# Patient Record
Sex: FEMALE | Race: WHITE
[De-identification: names, ages, dates, MRNs, and addresses within clinical notes are randomized per-mention and may not be internally consistent; named-entity substitution may affect disease eponyms.]

---

## 2019-10-11 ENCOUNTER — HOSPITAL ENCOUNTER (OUTPATIENT)
Dept: HOSPITAL 47 - RADMAMWWP | Age: 44
Discharge: HOME | End: 2019-10-11
Attending: FAMILY MEDICINE
Payer: COMMERCIAL

## 2019-10-11 DIAGNOSIS — Z12.31: Primary | ICD-10-CM

## 2019-10-11 PROCEDURE — 77067 SCR MAMMO BI INCL CAD: CPT

## 2019-10-14 NOTE — MM
Reason for exam: screening  (asymptomatic).

Last mammogram was performed 3 years and 1 month ago.



History:

Patient is nulliparous.



Physical Findings:

A clinical breast exam by your physician is recommended on an annual basis and 

results should be correlated with mammographic findings.



MG Screening Mammo w CAD

Bilateral CC and MLO view(s) were taken.

Prior study comparison: September 16, 2016, bilateral MG 3d screening mammo w/cad.

The breast tissue is heterogeneously dense. This may lower the sensitivity of 

mammography.  Benign appearing diffuse calcifications in the right breast. No 

suspicious abnormality. Stable central left posterior depth asymmetry.  No 

significant changes when compared with prior studies.





ASSESSMENT: Benign, BI-RAD 2



RECOMMENDATION:

Routine screening mammogram of both breasts in 1 year.

## 2022-01-07 ENCOUNTER — HOSPITAL ENCOUNTER (OUTPATIENT)
Dept: HOSPITAL 47 - RADMAMWWP | Age: 47
Discharge: HOME | End: 2022-01-07
Attending: FAMILY MEDICINE
Payer: COMMERCIAL

## 2022-01-07 DIAGNOSIS — Z12.31: Primary | ICD-10-CM

## 2022-01-07 PROCEDURE — 77067 SCR MAMMO BI INCL CAD: CPT

## 2022-01-07 PROCEDURE — 77063 BREAST TOMOSYNTHESIS BI: CPT

## 2022-01-10 NOTE — MM
Reason for exam: screening  (asymptomatic).

Last mammogram was performed 2 years and 3 months ago.



History:

Patient is nulliparous.

Took hormonal contraceptives for 15 years beginning at age 16.



Physical Findings:

A clinical breast exam by your physician is recommended on an annual basis and 

results should be correlated with mammographic findings.



MG 3D Screening Mammo W/Cad

Bilateral CC and MLO view(s) were taken.

Prior study comparison: October 11, 2019, bilateral MG screening mammo w CAD.  

September 16, 2016, bilateral MG 3d screening mammo w/cad.

The breast tissue is heterogeneously dense. This may lower the sensitivity of 

mammography.  Benign appearing bilateral calcifications. Grouped calcifications 

left upper inner quadrant.





ASSESSMENT: Incomplete: need additional imaging evaluation, BI-RAD 0



RECOMMENDATION:

Special view mammogram of the left breast.



[facility] will attempt to contact patient to return for supplemental 

views.

## 2022-01-12 ENCOUNTER — HOSPITAL ENCOUNTER (OUTPATIENT)
Dept: HOSPITAL 47 - RADMAMWWP | Age: 47
Discharge: HOME | End: 2022-01-12
Attending: FAMILY MEDICINE
Payer: COMMERCIAL

## 2022-01-12 DIAGNOSIS — R92.8: Primary | ICD-10-CM

## 2022-01-12 PROCEDURE — 77061 BREAST TOMOSYNTHESIS UNI: CPT

## 2022-01-12 PROCEDURE — 77065 DX MAMMO INCL CAD UNI: CPT

## 2022-01-13 NOTE — MM
Reason for exam: additional evaluation requested from abnormal screening.

Last mammogram was performed less than 1 month ago.



History:

Patient is nulliparous.

Took hormonal contraceptives for 15 years beginning at age 16.



Physical Findings:

Nurse did not find any significant physical abnormalities on exam.



MG 3D Work Up W/Cad LT

CC with magnification, LM with magnification, and LM view(s) were taken of the 

left breast.

Prior study comparison: January 7, 2022, bilateral MG 3d screening mammo w/cad.  

October 11, 2019, bilateral MG screening mammo w CAD.

The breast tissue is heterogeneously dense. This may lower the sensitivity of 

mammography.  No persistent suspicious grouped calcifications in the left breast 

on additional views.



These results were verbally communicated with the patient and result sheet given 

to the patient on 1/12/22.





ASSESSMENT: Negative, BI-RAD 1



RECOMMENDATION:

Return to routine screening mammogram schedule for both breasts.

## 2023-06-30 ENCOUNTER — HOSPITAL ENCOUNTER (OUTPATIENT)
Dept: HOSPITAL 47 - ORWHC2ENDO | Age: 48
Discharge: HOME | End: 2023-06-30
Attending: INTERNAL MEDICINE
Payer: COMMERCIAL

## 2023-06-30 VITALS — DIASTOLIC BLOOD PRESSURE: 78 MMHG | HEART RATE: 70 BPM | SYSTOLIC BLOOD PRESSURE: 125 MMHG

## 2023-06-30 VITALS — TEMPERATURE: 97.6 F

## 2023-06-30 VITALS — RESPIRATION RATE: 16 BRPM

## 2023-06-30 DIAGNOSIS — Z79.899: ICD-10-CM

## 2023-06-30 DIAGNOSIS — Z80.0: ICD-10-CM

## 2023-06-30 DIAGNOSIS — K21.9: ICD-10-CM

## 2023-06-30 DIAGNOSIS — F32.A: ICD-10-CM

## 2023-06-30 DIAGNOSIS — D12.8: ICD-10-CM

## 2023-06-30 DIAGNOSIS — Z12.11: Primary | ICD-10-CM

## 2023-06-30 PROCEDURE — 45385 COLONOSCOPY W/LESION REMOVAL: CPT

## 2023-06-30 PROCEDURE — 88305 TISSUE EXAM BY PATHOLOGIST: CPT

## 2023-06-30 PROCEDURE — 81025 URINE PREGNANCY TEST: CPT

## 2023-06-30 NOTE — P.PCN
Date of Procedure: 06/30/23


Procedure(s) Performed: 


BRIEF HISTORY: Patient is a 47-year-old pleasant white female scheduled for an 

elective colonoscopy as a part of screening for colon cancer and family history 

of colon cancer.  Her mother was tightness with colon cancer at age 74.





PROCEDURE PERFORMED: Colonoscopy with snare polypectomy. 





PREOPERATIVE DIAGNOSIS: Screening for colon cancer and family history of colon 

cancer. 





IV sedation per Anesthesia. 





PROCEDURE: After informed consent was obtained, the patient, was brought into 

the endoscopy unit. IV sedation was administered by Anesthesia under continuous 

monitoring.  Digital rectal examination was normal. Initially the Olympus CF-160

flexible video colonoscope was then inserted in the rectum, gradually advanced 

into the cecum without any difficulty. Careful examination was performed as the 

scope was gradually being withdrawn. Ileocecal valve and the appendiceal orifice

were visualized and appeared normal.  Prep was excellent. Mucosa of the cecum, 

ascending colon, transverse colon, descending colon, sigmoid colon, and rectum 

appeared normal.  The proximal rectum there was a 1 cm polyp that was removed by

snare polypectomy.  Retroflexion was performed in the rectum and no lesions were

seen. The patient tolerated the procedure well. 





IMPRESSION: 


1 cm proximal rectal polyp status post polypectomy


Rest of the colon appeared normal





RECOMMENDATIONS:  Findings of this examination were discussed with the patient 

as well as her family.  She was advised to follow with the biopsy results and 

have a repeat colonoscopy in 3 years..